# Patient Record
Sex: MALE | Race: WHITE
[De-identification: names, ages, dates, MRNs, and addresses within clinical notes are randomized per-mention and may not be internally consistent; named-entity substitution may affect disease eponyms.]

---

## 2017-08-16 ENCOUNTER — HOSPITAL ENCOUNTER (EMERGENCY)
Dept: HOSPITAL 62 - ER | Age: 47
Discharge: HOME | End: 2017-08-16
Payer: SELF-PAY

## 2017-08-16 VITALS — SYSTOLIC BLOOD PRESSURE: 162 MMHG | DIASTOLIC BLOOD PRESSURE: 76 MMHG

## 2017-08-16 DIAGNOSIS — Z91.040: ICD-10-CM

## 2017-08-16 DIAGNOSIS — J20.9: Primary | ICD-10-CM

## 2017-08-16 DIAGNOSIS — R06.02: ICD-10-CM

## 2017-08-16 DIAGNOSIS — R05: ICD-10-CM

## 2017-08-16 DIAGNOSIS — Z91.030: ICD-10-CM

## 2017-08-16 DIAGNOSIS — F17.200: ICD-10-CM

## 2017-08-16 DIAGNOSIS — R06.2: ICD-10-CM

## 2017-08-16 PROCEDURE — 94640 AIRWAY INHALATION TREATMENT: CPT

## 2017-08-16 PROCEDURE — 71020: CPT

## 2017-08-16 PROCEDURE — 99283 EMERGENCY DEPT VISIT LOW MDM: CPT

## 2017-08-16 NOTE — ER DOCUMENT REPORT
ED Respiratory Problem





- General


Chief Complaint: Cough


Stated Complaint: COUGH,SHORTNESS OF BREATH


Time Seen by Provider: 08/16/17 18:12


Mode of Arrival: Ambulatory


Information source: Patient


TRAVEL OUTSIDE OF THE [place] IN LAST 30 DAYS: No





- HPI


Patient complains to provider of: Cough, Short of breath


Onset: Other - 1 month


Duration: Worse/persistent


Context: Smoker


Short of Breath: Moderate


Chest pain/discomfort: Tightness


Cough: Nonproductive


Associated symptoms: Cough, Short of breath, Wheezing


Notes: 





Patient is a 47-year-old male presenting to the emergency room today 

complaining of 1 month history of nonproductive cough with difficulty breathing 

and shortness of breath, he is a smoker having cut back recently from 2 packs a 

day, no fevers, he does have occasional posttussive emesis





- Related Data


Allergies/Adverse Reactions: 


 





latex Allergy (Verified 08/16/17 18:02)


 


bee stings Allergy (Uncoded 08/16/17 18:02)


 











Past Medical History





- General


Information source: Patient





- Social History


Smoking Status: Current Every Day Smoker


Chew tobacco use (# tins/day): No


Frequency of alcohol use: None


Drug Abuse: None


Family History: Reviewed & Not Pertinent


Renal/ Medical History: Denies: Hx Peritoneal Dialysis





Review of Systems





- Review of Systems


Constitutional: No symptoms reported


EENT: No symptoms reported


Cardiovascular: No symptoms reported


Respiratory: See HPI


Gastrointestinal: No symptoms reported


Genitourinary: No symptoms reported


Male Genitourinary: No symptoms reported


Musculoskeletal: No symptoms reported


Skin: No symptoms reported


Hematologic/Lymphatic: No symptoms reported


Neurological/Psychological: No symptoms reported


-: Yes All other systems reviewed and negative





Physical Exam





- Vital signs


Vitals: 


 











Temp Pulse Resp BP Pulse Ox


 


 98.4 F   77   16   148/88 H  96 


 


 08/16/17 17:58  08/16/17 17:58  08/16/17 17:58  08/16/17 17:58  08/16/17 17:58











Interpretation: Normal





- General


General appearance: Appears well, Alert





- HEENT


Head: Normocephalic, Atraumatic


Eyes: Normal


Pupils: PERRL





- Respiratory


Respiratory status: No respiratory distress


Chest status: Nontender


Breath sounds: Nonproductive cough, Rhonchi, Wheezing


Chest palpation: Normal





- Cardiovascular


Rhythm: Regular


Heart sounds: Normal auscultation


Murmur: No





- Abdominal


Inspection: Normal


Distension: No distension


Bowel sounds: Normal


Tenderness: Nontender


Organomegaly: No organomegaly





- Back


Back: Normal, Nontender





- Extremities


General upper extremity: Normal inspection, Nontender, Normal color, Normal ROM

, Normal temperature


General lower extremity: Normal inspection, Nontender, Normal color, Normal ROM

, Normal temperature, Normal weight bearing.  No: Og's sign





- Neurological


Neuro grossly intact: Yes


Cognition: Normal


Orientation: AAOx4


Dali Coma Scale Eye Opening: Spontaneous


Macfarlan Coma Scale Verbal: Oriented


Mac[place]an Coma Scale Motor: Obeys Commands


Dali Coma Scale Total: 15


Speech: Normal


Motor strength normal: LUE, RUE, LLE, RLE


Sensory: Normal





- Psychological


Associated symptoms: Normal affect, Normal mood





- Skin


Skin Temperature: Warm


Skin Moisture: Dry


Skin Color: Normal





Course





- Re-evaluation


Re-evalutation: 





08/16/17 20:37


Patient reports feeling much better after breathing treatments, symptoms are 

consistent with likely COPD exacerbation, since he is a smoker and an EMT going 

to place him on a Z-Thom in case there is an underlying infection, patient was 

discharged with instructions to stop smoking, follow-up with a primary care 

provider or return if symptoms worsen, patient acknowledges understanding and 

agreement with this plan





- Vital Signs


Vital signs: 


 











Temp Pulse Resp BP Pulse Ox


 


 98.4 F   77   16   148/88 H  96 


 


 08/16/17 17:58  08/16/17 17:58  08/16/17 17:58  08/16/17 17:58  08/16/17 17:58














- Diagnostic Test


Radiology reviewed: Image reviewed





Discharge





- Discharge


Clinical Impression: 


Acute bronchitis


Qualifiers:


 Bronchitis organism: unspecified organism Qualified Code(s): J20.9 - Acute 

bronchitis, unspecified





Condition: Stable


Disposition: HOME, SELF-CARE


Instructions:  Bronchitis ([facility]), Chronic Obstructive Lung Disease ([facility])


Additional Instructions: 


Follow up with your primary care provider in one to 2 days.  Return to the 

emergency room immediately if symptoms worsen or any additional concerns.





Your chest x-ray today did not show any signs of active infection or other 

abnormalities.


Prescriptions: 


Albuterol Sulfate [Proair HFA Inhalation Aerosol 8.5 gm MDI] 1 puff IH Q4 PRN #

1 mdi


 PRN Reason: 


Albuterol Sulfate [Albuterol Sulfate 2.5mg/3 mL] 1 vial IH Q4 PRN #30 vial


 PRN Reason: 


Azithromycin [Zithromax 250 mg Tablet] 250 mg PO ASDIR PRN #6 tablet


 PRN Reason: 


Prednisone 40 mg PO DAILY #8 tablet


Forms:  Return to Work, Smoking Cessation Education

## 2017-08-18 NOTE — RADIOLOGY REPORT (SQ)
EXAM DESCRIPTION:  CHEST PA/LAT



COMPLETED DATE/TIME:  8/16/2017 7:01 pm



REASON FOR STUDY:  cough



COMPARISON:  2/12/2015



EXAM PARAMETERS:  NUMBER OF VIEWS: two views

TECHNIQUE: Digital Frontal and Lateral radiographic views of the chest acquired.

RADIATION DOSE: NA

LIMITATIONS: none



FINDINGS:  LUNGS AND PLEURA: No opacities, masses or pneumothorax. No pleural effusion.

MEDIASTINUM AND HILAR STRUCTURES: No masses or contour abnormalities.

HEART AND VASCULAR STRUCTURES: Heart normal size.  No evidence for failure.

BONES: No acute findings.

HARDWARE: None in the chest.

OTHER: No other significant finding.



IMPRESSION:  NO ACUTE RADIOGRAPHIC FINDING IN THE CHEST.



TECHNICAL DOCUMENTATION:  JOB ID:  [id number]

 2011 [employer]- All Rights Reserved

## 2018-10-17 ENCOUNTER — HOSPITAL ENCOUNTER (EMERGENCY)
Dept: HOSPITAL 62 - ER | Age: 48
Discharge: HOME | End: 2018-10-17
Payer: SELF-PAY

## 2018-10-17 VITALS — SYSTOLIC BLOOD PRESSURE: 106 MMHG | DIASTOLIC BLOOD PRESSURE: 55 MMHG

## 2018-10-17 DIAGNOSIS — R53.81: ICD-10-CM

## 2018-10-17 DIAGNOSIS — R09.89: ICD-10-CM

## 2018-10-17 DIAGNOSIS — J02.8: Primary | ICD-10-CM

## 2018-10-17 DIAGNOSIS — H92.09: ICD-10-CM

## 2018-10-17 DIAGNOSIS — R53.1: ICD-10-CM

## 2018-10-17 DIAGNOSIS — R09.81: ICD-10-CM

## 2018-10-17 DIAGNOSIS — J45.40: ICD-10-CM

## 2018-10-17 DIAGNOSIS — F17.210: ICD-10-CM

## 2018-10-17 DIAGNOSIS — R05: ICD-10-CM

## 2018-10-17 PROCEDURE — 99283 EMERGENCY DEPT VISIT LOW MDM: CPT

## 2018-10-17 PROCEDURE — 96372 THER/PROPH/DIAG INJ SC/IM: CPT

## 2018-10-17 PROCEDURE — 71046 X-RAY EXAM CHEST 2 VIEWS: CPT

## 2018-10-17 PROCEDURE — 94640 AIRWAY INHALATION TREATMENT: CPT

## 2018-10-17 NOTE — ER DOCUMENT REPORT
ED Respiratory Problem





- General


Chief Complaint: Nonproductive Cough


Stated Complaint: DIFFICULTY BREATHING


Time Seen by Provider: 10/17/18 02:59


Mode of Arrival: Ambulatory


Information source: Patient


Notes: 





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 

onset of congestion runny nose with cough.  Patient states that H just gotten 

to the point where he is having a hard time to take a deep breath.  He has 

increased cough and congestion when he lays down.  Denies having any fever.  

Denies any other medical problems.  He does smoke 1/2 pack of cigarettes a day.


TRAVEL OUTSIDE OF THE [place] IN LAST 30 DAYS: No





- HPI


Patient complains to provider of: Cough, Hurts to breath, Short of breath


Onset: Other - 2 weeks worse the past 2 days


Duration: Worse/persistent


Initiating Event: Exertion, Exposure to dust, Exposure to mold, Exposure to 

smoke, URI


Quality of pain: No pain


Severity: Moderate


Pain Level: 3


Short of Breath: Moderate


Cough: Productive


Sputum amount: Scant


At home treatment: Bronchodilators


Associated symptoms: Earache, Runny nose, Sore Throat, Wheezing


Similar symptoms previously: No


Recently seen / treated by doctor: No





- Related Data


Allergies/Adverse Reactions: 


 





latex Allergy (Verified 08/16/17 18:02)


 


bee stings Allergy (Uncoded 08/16/17 18:02)


 











Past Medical History





- Social History


Smoking Status: Current Every Day Smoker


Cigarette use (# per day): Yes - Half-pack


Smoking Education Provided: Yes


Frequency of alcohol use: Rare


Drug Abuse: None


Occupation: Medic


Lives with: Family


Family History: Reviewed & Not Pertinent


Patient has suicidal ideation: No


Patient has homicidal ideation: No


Renal/ Medical History: Denies: Hx Peritoneal Dialysis





Review of Systems





- Review of Systems


Constitutional: Malaise, Weakness


EENT: No symptoms reported, Nose congestion, Difficulty swallowing


Cardiovascular: No symptoms reported


Respiratory: See HPI, Cough, Short of breath, Wheezing


Gastrointestinal: No symptoms reported


Genitourinary: No symptoms reported


Male Genitourinary: No symptoms reported


Musculoskeletal: No symptoms reported


Skin: No symptoms reported


Hematologic/Lymphatic: No symptoms reported


Neurological/Psychological: No symptoms reported


-: Yes All other systems reviewed and negative





Physical Exam





- Vital signs


Vitals: 


 











Temp Pulse Resp BP Pulse Ox


 


 98.4 F   72   18   137/91 H  95 


 


 10/17/18 02:36  10/17/18 02:36  10/17/18 02:36  10/17/18 02:36  10/17/18 02:36











Interpretation: Hypertensive





- Notes


Notes: 





Well-nourished well-developed 48-year-old male no apparent distress but does 

appear somewhat uncomfortable appearing.





- General


In distress: None





- HEENT


Head: Normocephalic, Atraumatic


Eyes: Normal


Ears: Normal


External canal: Other - Examination of the head and upper airway showed nasal 

mucosa to be moderately erythematous and with rhinorrhea noted.  Bilateral TMs 

are bulging with mild air-fluid levels noted.  External canals both have some 

mild cerumen although not obstructing the TMs.  There is no noted frontal or 

maxillary sinus tenderness to palpation..  No: Blood in canal, Erythema, 

Foreign body, Swollen


Tympanic membrane: Bulging, Injected.  No: Perforation


Sinus: Normal, Frontal, Maxillary


Nasal: Normal


Mouth/Lips: Normal


Mucous membranes: Normal, Moist


Pharynx: Erythema, Exudate.  No: Blood in hypopharynx, Peritonsillar abscess, 

Post nasal drainage, Retropharyngeal abscess, Tonsillar hypertrophy, Uvular 

edema, Potential airway comprom.


Neck: Anterior cervical chain, Lymphadenopathy, Supple.  No: Posterior cervical 

chain, Brudzinski, Meningismus, Neck mass, Thyroid nodule





- Respiratory


Respiratory status: No respiratory distress


Chest status: Nontender


Breath sounds: Decreased air movement, Productive cough, Wheezing.  No: Normal, 

Nonproductive cough, Rales, Rhonchi, Stridor


Chest palpation: Normal





- Cardiovascular


Rhythm: Regular


Heart sounds: Normal auscultation


Murmur: No


Pulses: Bounding: Carotid





- Neurological


Neuro grossly intact: Yes


Cognition: Normal


Orientation: AAOx4


Dali Coma Scale Eye Opening: Spontaneous


Dali Coma Scale Verbal: Oriented


Dali Coma Scale Motor: Obeys Commands


[place] Coma Scale Total: 15


Speech: Normal





- Skin


Skin Temperature: Warm


Skin Moisture: Dry


Skin Color: Pink





Course





- Re-evaluation


Re-evalutation: 





10/17/18 05:24


Patient improved slightly after the breathing treatment.  His presentation of 

his posterior pharynx was more justified then his wheezing.  I believe he is 

got upper airway bronchitis asthmatic type and also a pharyngitis the spring 

pretty well.  So I will place him on some Augmentin for the throat and a 

steroid taper because of the size of the inflammation of the tonsils and which 

will also help with his wheezing.





- Vital Signs


Vital signs: 


 











Temp Pulse Resp BP Pulse Ox


 


 98.4 F   72   18   137/91 H  95 


 


 10/17/18 02:36  10/17/18 02:36  10/17/18 02:36  10/17/18 02:36  10/17/18 02:36














Discharge





- Discharge


Clinical Impression: 


Pharyngitis


Qualifiers:


 Pharyngitis/tonsillitis etiology: other specified organisms Qualified Code(s): 

J02.8 - Acute pharyngitis due to other specified organisms





Asthmatic bronchitis


Qualifiers:


 Asthma severity: moderate Asthma persistence: persistent Asthma complication 

type: uncomplicated Qualified Code(s): J45.40 - Moderate persistent asthma, 

uncomplicated





Condition: Stable


Disposition: HOME, SELF-CARE


Instructions:  Bronchitis With Bronchospasm (Wheezing) ([facility]), Tonsillitis ([facility])

, Family Physicians / Practices


Additional Instructions: 


Home today and rest.  Medication as prescribed.  As we discussed use warm salt 

water gargles 3-4 times a day for the sore throat portion of it.  Tylenol 

alternating with Motrin for aches and pains and fever.  Should he become more 

short of breath or spike a fever does not go away with the Tylenol Motrin 

return to ER for recheck.


Prescriptions: 


Amox Tr/Potassium Clavulanate [Augmentin 875-125 Tablet] 1 tab PO BID 10 Days #

20 tablet


Prednisone 10 mg PO ASDIR PRN 6 Days #1 tab.ds.pk


 PRN Reason: 


Forms:  Elevated Blood Pressure, Smoking Cessation Education, Return to Work

## 2018-10-17 NOTE — RADIOLOGY REPORT (SQ)
EXAM DESCRIPTION: 



XR CHEST 2 VIEWS



COMPLETED DATE/TME:  10/17/2018 03:07



CLINICAL HISTORY: Cough



COMPARISON: None.



FINDINGS: Frontal and lateral views of the chest.  The

cardiomediastinal silhouette has normal size and contour. No

consolidation, pneumothorax, or pleural effusion.  No displaced

rib fractures identified.  Upper abdominal soft tissues are

unremarkable.



IMPRESSION:



1. No acute pulmonary process identified.